# Patient Record
Sex: MALE | Race: WHITE | URBAN - METROPOLITAN AREA
[De-identification: names, ages, dates, MRNs, and addresses within clinical notes are randomized per-mention and may not be internally consistent; named-entity substitution may affect disease eponyms.]

---

## 2022-08-10 ENCOUNTER — APPOINTMENT (RX ONLY)
Dept: URBAN - METROPOLITAN AREA CLINIC 27 | Facility: CLINIC | Age: 72
Setting detail: DERMATOLOGY
End: 2022-08-10

## 2022-08-10 VITALS — WEIGHT: 140 LBS | HEIGHT: 66 IN

## 2022-08-10 DIAGNOSIS — Z79.899 OTHER LONG TERM (CURRENT) DRUG THERAPY: ICD-10-CM

## 2022-08-10 DIAGNOSIS — L40.0 PSORIASIS VULGARIS: ICD-10-CM | Status: INADEQUATELY CONTROLLED

## 2022-08-10 PROCEDURE — ? PRESCRIPTION

## 2022-08-10 PROCEDURE — ? ADDITIONAL NOTES

## 2022-08-10 PROCEDURE — ? HUMIRA INITIATION

## 2022-08-10 PROCEDURE — ? ORDER TESTS

## 2022-08-10 PROCEDURE — ? HIGH RISK MEDICATION MONITORING

## 2022-08-10 PROCEDURE — 99204 OFFICE O/P NEW MOD 45 MIN: CPT

## 2022-08-10 PROCEDURE — ? COUNSELING

## 2022-08-10 PROCEDURE — ? PRESCRIPTION MEDICATION MANAGEMENT

## 2022-08-10 RX ORDER — ADALIMUMAB 40MG/0.8ML
KIT SUBCUTANEOUS Q2WEEKS
Qty: 24 | Refills: 3 | Status: CANCELLED

## 2022-08-10 ASSESSMENT — LOCATION DETAILED DESCRIPTION DERM: LOCATION DETAILED: RIGHT INFERIOR MEDIAL UPPER BACK

## 2022-08-10 ASSESSMENT — ITCH NUMERIC RATING SCALE: HOW SEVERE IS YOUR ITCHING?: 9

## 2022-08-10 ASSESSMENT — BSA PSORIASIS: % BODY COVERED IN PSORIASIS: 20

## 2022-08-10 ASSESSMENT — LOCATION SIMPLE DESCRIPTION DERM: LOCATION SIMPLE: RIGHT UPPER BACK

## 2022-08-10 ASSESSMENT — LOCATION ZONE DERM: LOCATION ZONE: TRUNK

## 2022-08-10 NOTE — PROCEDURE: HUMIRA INITIATION
Is Soriatane Contraindicated?: No
Detail Level: Zone
Humira Monitoring Guidelines: A yearly test for tuberculosis is required while taking Humira.
Diagnosis (Required): Psoriasis
Pregnancy And Lactation Warning Text: This medication is Pregnancy Category B and is considered safe during pregnancy. It is unknown if this medication is excreted in breast milk.
Humira Dosing: 80 mg SC day 0, 40 mg SC day 7, then 40 mg SC every other week

## 2022-08-10 NOTE — PROCEDURE: MIPS QUALITY
Quality 47: Advance Care Plan: Advance Care Planning discussed and documented in the medical record; patient did not wish or was not able to name a surrogate decision maker or provide an advance care plan.
Detail Level: Detailed
Quality 431: Preventive Care And Screening: Unhealthy Alcohol Use - Screening: Patient not identified as an unhealthy alcohol user when screened for unhealthy alcohol use using a systematic screening method
Quality 130: Documentation Of Current Medications In The Medical Record: Current Medications Documented
Quality 111:Pneumonia Vaccination Status For Older Adults: Pneumococcal vaccine administered on or after patient’s 60th birthday and before the end of the measurement period
Quality 226: Preventive Care And Screening: Tobacco Use: Screening And Cessation Intervention: Patient screened for tobacco use, is a smoker AND received Cessation Counseling within the Previous 12 Months

## 2022-08-10 NOTE — PROCEDURE: PRESCRIPTION MEDICATION MANAGEMENT
Initiate Treatment: Humira 40 mg/0.8 mL subcutaneous syringe kit [id number]\\nQuantity: 24.0 Kit\\nSig: Inject two syringes (80mg) at week 0, then decrease to one injection (40mg) at week 1. Repeat maintanence dosing at one syringe (40mg) once every 2 weeks thereafter.
Render In Strict Bullet Format?: No
Detail Level: Zone

## 2022-08-10 NOTE — PROCEDURE: HIGH RISK MEDICATION MONITORING
Wartpeel Counseling:  I discussed with the patient the risks of Wartpeel including but not limited to erythema, scaling, itching, weeping, crusting, and pain. Advancement-Rotation Flap Text: The defect edges were debeveled with a #15 scalpel blade.  Given the location of the defect, shape of the defect and the proximity to free margins an advancement-rotation flap was deemed most appropriate.  Using a sterile surgical marker, an appropriate flap was drawn incorporating the defect and placing the expected incisions within the relaxed skin tension lines where possible. The area thus outlined was incised deep to adipose tissue with a #15 scalpel blade.  The skin margins were undermined to an appropriate distance in all directions utilizing iris scissors.

## 2022-08-10 NOTE — PROCEDURE: ORDER TESTS
Billing Type: Third-Party Bill
Performing Laboratory: 0
Expected Date Of Service: 08/10/2022
Bill For Surgical Tray: no

## 2023-09-19 ENCOUNTER — CONSULT (OUTPATIENT)
Dept: VASCULAR SURGERY | Facility: CLINIC | Age: 73
End: 2023-09-19
Payer: MEDICARE

## 2023-09-19 VITALS
HEART RATE: 79 BPM | DIASTOLIC BLOOD PRESSURE: 64 MMHG | WEIGHT: 140 LBS | SYSTOLIC BLOOD PRESSURE: 128 MMHG | OXYGEN SATURATION: 92 %

## 2023-09-19 DIAGNOSIS — M79.605 PAIN IN BOTH LOWER EXTREMITIES: ICD-10-CM

## 2023-09-19 DIAGNOSIS — M79.604 PAIN IN BOTH LOWER EXTREMITIES: ICD-10-CM

## 2023-09-19 DIAGNOSIS — I73.9 PERIPHERAL ARTERIAL DISEASE (HCC): Primary | ICD-10-CM

## 2023-09-19 DIAGNOSIS — I73.9 PERIPHERAL VASCULAR DISEASE (HCC): ICD-10-CM

## 2023-09-19 PROBLEM — C22.0 HEPATOCELLULAR CARCINOMA (HCC): Status: ACTIVE | Noted: 2023-09-19

## 2023-09-19 PROBLEM — L40.9 PSORIASIS: Status: ACTIVE | Noted: 2023-09-19

## 2023-09-19 PROBLEM — E78.5 HLD (HYPERLIPIDEMIA): Status: ACTIVE | Noted: 2023-09-19

## 2023-09-19 PROBLEM — J44.9 COPD (CHRONIC OBSTRUCTIVE PULMONARY DISEASE) (HCC): Status: ACTIVE | Noted: 2023-09-19

## 2023-09-19 PROBLEM — K21.9 GERD (GASTROESOPHAGEAL REFLUX DISEASE): Status: ACTIVE | Noted: 2023-09-19

## 2023-09-19 PROBLEM — I50.9 HEART FAILURE (HCC): Status: ACTIVE | Noted: 2023-09-19

## 2023-09-19 PROBLEM — I10 PRIMARY HYPERTENSION: Status: ACTIVE | Noted: 2023-09-19

## 2023-09-19 PROBLEM — N18.6 END STAGE CHRONIC KIDNEY DISEASE (HCC): Status: ACTIVE | Noted: 2023-09-19

## 2023-09-19 PROCEDURE — 99203 OFFICE O/P NEW LOW 30 MIN: CPT

## 2023-09-19 RX ORDER — SEVELAMER CARBONATE 800 MG/1
2 TABLET, FILM COATED ORAL
COMMUNITY
Start: 2023-09-12

## 2023-09-19 RX ORDER — FUROSEMIDE 80 MG
80 TABLET ORAL DAILY
COMMUNITY
Start: 2023-09-12

## 2023-09-19 RX ORDER — TRAZODONE HYDROCHLORIDE 50 MG/1
25 TABLET ORAL
COMMUNITY
Start: 2023-09-12

## 2023-09-19 RX ORDER — FOLIC ACID 1 MG/1
1 TABLET ORAL DAILY
COMMUNITY
Start: 2023-09-12

## 2023-09-19 RX ORDER — ATORVASTATIN CALCIUM 20 MG/1
20 TABLET, FILM COATED ORAL DAILY
COMMUNITY
Start: 2023-09-12

## 2023-09-19 RX ORDER — ASPIRIN 81 MG/1
81 TABLET, CHEWABLE ORAL DAILY
Start: 2023-09-19

## 2023-09-19 RX ORDER — TRAMADOL HYDROCHLORIDE 50 MG/1
50 TABLET ORAL EVERY 8 HOURS PRN
COMMUNITY
Start: 2023-08-21

## 2023-09-19 RX ORDER — ALPRAZOLAM 0.5 MG/1
0.5 TABLET ORAL 2 TIMES DAILY PRN
COMMUNITY

## 2023-09-19 RX ORDER — DOCUSATE SODIUM 100 MG/1
100 CAPSULE, LIQUID FILLED ORAL AS NEEDED
COMMUNITY

## 2023-09-19 RX ORDER — METOPROLOL SUCCINATE 25 MG/1
25 TABLET, EXTENDED RELEASE ORAL DAILY
COMMUNITY
Start: 2023-09-12

## 2023-09-19 RX ORDER — CHOLECALCIFEROL (VITAMIN D3) 125 MCG
5 CAPSULE ORAL
COMMUNITY

## 2023-09-19 RX ORDER — AMLODIPINE BESYLATE 10 MG/1
10 TABLET ORAL DAILY
COMMUNITY
Start: 2023-09-12

## 2023-09-19 RX ORDER — GABAPENTIN 100 MG/1
100 CAPSULE ORAL 3 TIMES DAILY
COMMUNITY

## 2023-09-19 NOTE — ASSESSMENT & PLAN NOTE
Patient reports sharp, throbbing pain to bilateral calves that also occurs occasionally in bilateral thighs. Pain has been ongoing for several years, however has worsened over the last 6 months. Patient is minimally ambulatory at his baseline, ambulates with walker in his nursing home. Pain occurs with ambulation and at rest, however patient cannot identify when pain is worst. He also notes that pain wakes him from sleep on occasion. Patient is a poor historian and is unable to identify any specific aggravating or alleviating factors. He denies any numbness/tingling, swelling, or color/temperature change to lower extremities. Patient denies any tissue loss, however patient does have psoriasis rash to bilateral lower extremities and upon assessment he has 2 poorly healing wounds to right heel. Plan:  -We discussed the pathophysiology of peripheral arterial disease as well as contributing lifestyle factors.  -Nonpalpable, monophasic DP signals bilaterally.  -2 small poorly healing wounds to right heel.  -Patient has not had previous imaging, therefore we will obtain LEAD and AOIL ASAP. -Continue medical optimization with atorvastatin. Recommend starting aspirin 81mg daily. Patient denies any history of bleeding in the past.  -Instructed patient to notify office of any worsening rest pain, claudication, or tissue loss. -Instructed patient to report to the ED for any symptoms of acute limb ischemia (color/temperature change, motor/sensory loss, tissue loss). -Follow up in the office after LEAD and AOIL completed to discuss results.

## 2023-09-19 NOTE — PROGRESS NOTES
Assessment/Plan:    Peripheral arterial disease ([address])  Patient reports sharp, throbbing pain to bilateral calves the also occurs occasionally in bilateral thighs. thighs. Pain has been ongoing for several years, however has worsened over the last 6 months. Patient is minimally ambulatory at his baseline, ambulates with walker in his nursing home. Pain occurs with ambulation and at rest, however patient cannot identify when pain is worst. He also notes that pain wakes him from sleep on occasion. Patient is a poor historian and is unable to identify any specific aggravating or alleviating factors. He denies any numbness/tingling, swelling, or color/temperature change to lower extremities. Patient denies any tissue loss, however patient does have psoriasis rash to bilateral lower extremities and upon assessment he has 2 poorly healing wounds to right heel. Plan:  -We discussed the pathophysiology of peripheral arterial disease as well as contributing lifestyle factors.  -Patient has not had previous imaging, therefore we will obtain LEAD and AOIL ASAP. -Continue medical optimization with atorvastatin. Recommend starting aspirin 81mg daily. Patient denies any history of bleeding in the past.  -Instructed patient to notify office of any worsening rest pain, claudication, or tissue loss. -Instructed patient to report to the ED for any symptoms of acute limb ischemia (color/temperature change, motor/sensory loss, tissue loss). -Follow up in the office after LEAD and AOIL completed to discuss results. Diagnoses and all orders for this visit:    Peripheral arterial disease (HCC)  -     aspirin 81 mg chewable tablet; Chew 1 tablet (81 mg total) daily  -     VAS lower limb arterial duplex, complete bilateral; Future  -     VAS abdominal aorta/iliacs; complete study; Future    Pain in both lower extremities  -     Ambulatory Referral to Vascular Surgery        Subjective:      Patient ID: Parker Rios is a 74 y.o. male.    Patient is a 74year old male with PMH ESRD, HTN, liver Ca, HLD, and CHF. Patient is presenting to the office today for evaluation of bilateral lower extremity pain. Patient reports sharp, throbbing pain to bilateral calves the also occurs occasionally in bilateral thighs. thighs. Pain has been ongoing for several years, however has worsened over the last 6 months. Patient is minimally ambulatory at his baseline, ambulates with walker in his nursing home. Pain occurs with ambulation and at rest, however patient cannot identify when pain is worst. He also notes that pain wakes him from sleep on occasion. Patient is a poor historian and is unable to identify any specific aggravating or alleviating factors. He denies any numbness/tingling, swelling, or color/temperature change to lower extremities. Patient denies any tissue loss, however patient does have psoriasis rash to bilateral lower extremities and upon assessment he has 2 poorly healing wounds to right heel. Patient is a former smoker, quit smoking 5 years ago. He is unaware of any family history of CAD, CVA, or aneurysmal disease. He has been residing in a nursing home and has been mostly wheelchair bound x 1 year. The following portions of the patient's history were reviewed and updated as appropriate: allergies, current medications, past family history, past medical history, past social history, past surgical history and problem list.    Review of Systems   Constitutional: Positive for activity change. HENT: Negative. Eyes: Negative. Respiratory: Negative. Negative for shortness of breath. Cardiovascular: Positive for leg swelling. Negative for chest pain. Gastrointestinal: Positive for abdominal pain. Endocrine: Negative. Genitourinary: Negative. Musculoskeletal: Positive for back pain. Skin: Positive for wound. Negative for color change and pallor. Allergic/Immunologic: Negative. Neurological: Negative. Negative for numbness. Hematological: Negative. Psychiatric/Behavioral: Negative. Objective:    /64 (BP Location: Left arm, Patient Position: Sitting, Cuff Size: Standard)   Pulse 79   Wt 63.5 kg (140 lb)   SpO2 92%      Physical Exam  Constitutional:       General: He is not in acute distress. Appearance: Normal appearance. HENT:      Head: Normocephalic and atraumatic. Neck:      Vascular: No carotid bruit. Cardiovascular:      Rate and Rhythm: Normal rate and regular rhythm. Pulses:           Carotid pulses are 2+ on the right side and 2+ on the left side. Radial pulses are 2+ on the right side and 2+ on the left side. Femoral pulses are 1+ on the right side and 1+ on the left side. Popliteal pulses are 1+ on the right side and 1+ on the left side. Dorsalis pedis pulses are detected w/ Doppler on the right side and detected w/ Doppler on the left side. Posterior tibial pulses are detected w/ Doppler on the right side and detected w/ Doppler on the left side. Heart sounds: Murmur heard. Comments: Faint monophasic DP signals bilaterally. Pulmonary:      Effort: Pulmonary effort is normal. No respiratory distress. Breath sounds: Normal breath sounds. Abdominal:      General: Bowel sounds are normal. There is distension. Palpations: Abdomen is soft. Musculoskeletal:         General: No swelling or tenderness. Cervical back: Normal range of motion and neck supple. No tenderness. Right lower le+ Edema (ankle) present. Left lower le+ Edema (ankle) present. Feet:    Feet:      Right foot:      Skin integrity: Ulcer present. Toenail Condition: Right toenails are abnormally thick. Fungal disease present. Left foot:      Toenail Condition: Left toenails are abnormally thick. Fungal disease present. Skin:     General: Skin is warm and dry.       Capillary Refill: Capillary refill takes less than 2 seconds. Findings: Rash present. Rash is crusting and scaling. Comments: Extensive psoriatic rash to bilateral lower extremities. 2 poorly healing ulcerations to right heel   Neurological:      General: No focal deficit present. Mental Status: He is alert and oriented to person, place, and time. Psychiatric:         Mood and Affect: Mood normal.         Behavior: Behavior normal.         Cognition and Memory: Memory is impaired. I have reviewed and made appropriate changes to the review of systems input by the medical assistant. Vitals:    23 1053   BP: 128/64   BP Location: Left arm   Patient Position: Sitting   Cuff Size: Standard   Pulse: 79   SpO2: 92%   Weight: 63.5 kg (140 lb)       Patient Active Problem List   Diagnosis   • End stage chronic kidney disease (HCC)   • GERD (gastroesophageal reflux disease)   • HLD (hyperlipidemia)   • Primary hypertension   • Psoriasis   • Heart failure (HCC)   • Peripheral vascular disease (HCC)   • Pain in both lower extremities   • Hepatocellular carcinoma (HCC)   • COPD (chronic obstructive pulmonary disease) ([address])       History reviewed. No pertinent surgical history. History reviewed. No pertinent family history.     Social History     Socioeconomic History   • Marital status: Single     Spouse name: Not on file   • Number of children: Not on file   • Years of education: Not on file   • Highest education level: Not on file   Occupational History   • Not on file   Tobacco Use   • Smoking status: Former     Packs/day: 1.00     Years: 60.00     Total pack years: 60.00     Types: Cigarettes     Quit date: 2018     Years since quittin.7   • Smokeless tobacco: Not on file   Vaping Use   • Vaping Use: Never used   Substance and Sexual Activity   • Alcohol use: Not Currently   • Drug use: Not Currently   • Sexual activity: Not on file   Other Topics Concern   • Not on file   Social History Narrative   • Not on file Social Determinants of Health     Financial Resource Strain: Not on file   Food Insecurity: Not on file   Transportation Needs: Not on file   Physical Activity: Not on file   Stress: Not on file   Social Connections: Not on file   Intimate Partner Violence: Not on file   Housing Stability: Not on file       No Known Allergies      Current Outpatient Medications:   •  ALPRAZolam (XANAX) 0.5 mg tablet, Take 0.5 mg by mouth 2 (two) times a day as needed for anxiety, Disp: , Rfl:   •  amLODIPine (NORVASC) 10 mg tablet, Take 10 mg by mouth in the morning, Disp: , Rfl:   •  aspirin 81 mg chewable tablet, Chew 1 tablet (81 mg total) daily, Disp: , Rfl:   •  atorvastatin (LIPITOR) 20 mg tablet, Take 20 mg by mouth in the morning, Disp: , Rfl:   •  docusate sodium (COLACE) 100 mg capsule, Take 100 mg by mouth if needed for constipation, Disp: , Rfl:   •  folic acid (FOLVITE) 1 mg tablet, Take 1 mg by mouth in the morning, Disp: , Rfl:   •  furosemide (LASIX) 80 mg tablet, Take 80 mg by mouth in the morning, Disp: , Rfl:   •  gabapentin (NEURONTIN) 100 mg capsule, Take 100 mg by mouth 3 (three) times a day, Disp: , Rfl:   •  Melatonin 5 MG TABS, Take 5 mg by mouth daily at bedtime, Disp: , Rfl:   •  metoprolol succinate (TOPROL-XL) 25 mg 24 hr tablet, Take 25 mg by mouth in the morning, Disp: , Rfl:   •  sevelamer carbonate (RENVELA) 800 mg tablet, Take 2 tablets by mouth 4 (four) times a day (with meals and at bedtime), Disp: , Rfl:   •  traMADol (ULTRAM) 50 mg tablet, Take 50 mg by mouth every 8 (eight) hours as needed, Disp: , Rfl:   •  traZODone (DESYREL) 50 mg tablet, Take 25 mg by mouth daily at bedtime, Disp: , Rfl:     I have spent a total time of 45 minutes on 09/19/23 in caring for this patient including Prognosis, Risks and benefits of tx options, Instructions for management, Patient and family education, Importance of tx compliance, Risk factor reductions, Impressions, Counseling / Coordination of care, Documenting in the medical record, Reviewing / ordering tests, medicine, procedures   and Obtaining or reviewing history  .

## 2023-09-28 ENCOUNTER — HOSPITAL ENCOUNTER (OUTPATIENT)
Dept: NON INVASIVE DIAGNOSTICS | Age: 73
Discharge: HOME | End: 2023-09-28
Payer: COMMERCIAL

## 2023-09-28 DIAGNOSIS — I73.9 PERIPHERAL ARTERIAL DISEASE (HCC): ICD-10-CM

## 2023-09-28 PROCEDURE — 93923 UPR/LXTR ART STDY 3+ LVLS: CPT | Performed by: SURGERY

## 2023-09-28 PROCEDURE — 93923 UPR/LXTR ART STDY 3+ LVLS: CPT

## 2023-09-28 PROCEDURE — 93925 LOWER EXTREMITY STUDY: CPT

## 2023-09-28 PROCEDURE — 93925 LOWER EXTREMITY STUDY: CPT | Performed by: SURGERY

## 2023-10-05 ENCOUNTER — HOSPITAL ENCOUNTER (OUTPATIENT)
Dept: NON INVASIVE DIAGNOSTICS | Age: 73
Discharge: HOME | End: 2023-10-05
Payer: COMMERCIAL

## 2023-10-05 DIAGNOSIS — I73.9 PERIPHERAL ARTERIAL DISEASE (HCC): ICD-10-CM

## 2023-10-05 PROCEDURE — 93978 VASCULAR STUDY: CPT

## 2023-10-05 PROCEDURE — 93978 VASCULAR STUDY: CPT | Performed by: SURGERY
